# Patient Record
(demographics unavailable — no encounter records)

---
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
## 2024-10-25 NOTE — ASSESSMENT
[FreeTextEntry1] : I had a lengthy discussion with the patient today regarding her displaced, partially healed left small finger proximal phalanx fracture.  I explained the challenging nature of the injury, given its chronicity and significant displacement.  I thoroughly explained both nonsurgical and surgical treatment options.  I explained that with nonsurgical treatment the goal would be to initiate aggressive hand therapy at this time to help regain satisfactory range of motion, understanding there would be limitations and persistent deformity given the displaced nature of the fracture.  I explained that an osteotomy can be considered later, if the deformity is a functional issue.  I discussed surgical intervention, which would entail closed versus open reduction internal fixation of left small finger proximal phalanx, which at this point would likely necessitate a partial osteotomy.  I explained the advantages of realigning more anatomic bony alignment which would ultimately correct the deformity, however, an additional period of immobilization would be required which would significantly set her back in regards to recovering her range of motion and possibly function.  I discussed the inherent risks and potential complications of surgery as well.  Shared decision making was made and the patient preferred an initial nonsurgical approach to this.  I issued the patient a prescription for OT and directed she follow-up with me in 1 month for reevaluation

## 2024-10-25 NOTE — PHYSICAL EXAM
[de-identified] : Physical exam demonstrates the patient to be alert and oriented x 3 and capable of ambulation. The patient is well-developed and well-nourished in no apparent respiratory distress. The majority of the skin is intact bilaterally in the upper extremities without any bilateral elbow lymphadenopathy.  The left small finger exhibits decreased active range of motion at all joints as well as diminished passive motion secondary to apprehension and pain.  Pseudo claw deformity is present.  No significant malrotation.  Minimally tender over the proximal phalanx.  Sensation is intact to light touch in the digit is well-perfused [de-identified] : PA, oblique and lateral x-rays of the left small finger were obtained today demonstrating a displaced apex volar proximal phalanx fracture with mild interval bony healing noted

## 2024-10-25 NOTE — PHYSICAL EXAM
[de-identified] : Physical exam demonstrates the patient to be alert and oriented x 3 and capable of ambulation. The patient is well-developed and well-nourished in no apparent respiratory distress. The majority of the skin is intact bilaterally in the upper extremities without any bilateral elbow lymphadenopathy.  The left small finger exhibits decreased active range of motion at all joints as well as diminished passive motion secondary to apprehension and pain.  Pseudo claw deformity is present.  No significant malrotation.  Minimally tender over the proximal phalanx.  Sensation is intact to light touch in the digit is well-perfused [de-identified] : PA, oblique and lateral x-rays of the left small finger were obtained today demonstrating a displaced apex volar proximal phalanx fracture with mild interval bony healing noted
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
